# Patient Record
Sex: FEMALE | Race: BLACK OR AFRICAN AMERICAN
[De-identification: names, ages, dates, MRNs, and addresses within clinical notes are randomized per-mention and may not be internally consistent; named-entity substitution may affect disease eponyms.]

---

## 2019-07-07 ENCOUNTER — HOSPITAL ENCOUNTER (EMERGENCY)
Dept: HOSPITAL 74 - FER | Age: 29
Discharge: HOME | End: 2019-07-07
Payer: COMMERCIAL

## 2019-07-07 VITALS — HEART RATE: 79 BPM | TEMPERATURE: 99.4 F

## 2019-07-07 VITALS — BODY MASS INDEX: 25 KG/M2

## 2019-07-07 VITALS — DIASTOLIC BLOOD PRESSURE: 102 MMHG | SYSTOLIC BLOOD PRESSURE: 141 MMHG

## 2019-07-07 DIAGNOSIS — J06.9: Primary | ICD-10-CM

## 2019-07-07 NOTE — PDOC
History of Present Illness





- General


History Source: Patient


Exam Limitations: No Limitations





- History of Present Illness


Initial Comments: 





07/07/19 13:15


Manjinder Diallo is a 29y previously healthy female presenting with nasal 

congestion. Nasal congestion started 3 days ago expressing green mucus. Cannot 

breathe through nasal passages. Associated right ear itching, throat irritation

, and 1d of generalized headache. Denies fever, vision change, chest pain, SOB, 

urinary, or bowel movement changes. Didn't use any medication for symptom 

relief. 





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





<Shawn Paris - Last Filed: 07/07/19 13:48>





<Portillo Farley - Last Filed: 07/07/19 14:42>





- General


Chief Complaint: Cold Symptoms


Stated Complaint: POSSIBLE SINUS INFECTION


Time Seen by Provider: 07/07/19 13:06





Past History





- Travel


Traveled outside of the country in the last 30 days: No


Close contact w/someone who was outside of country & ill: No





- Past Medical History


Asthma: No


Cardiac Disorders: No


COPD: No


CHF: No


Diabetes: No


GI Disorders: No


 Disorders: No


HTN: No


Liver Disease: No


Thyroid Disease: No


Other medical history: DENIES





- Suicide/Smoking/Psychosocial Hx


Smoking History: Current every day smoker


Number of Cigarettes Smoked Daily: 4


Information on smoking cessation initiated: Yes


Hx Alcohol Use: Yes (OCCASSIONAL)


Drug/Substance Use Hx: No





<RainaShawn - Last Filed: 07/07/19 13:48>





<Portillo Farley - Last Filed: 07/07/19 14:42>





- Past Medical History


Allergies/Adverse Reactions: 


 Allergies











Allergy/AdvReac Type Severity Reaction Status Date / Time


 


No Known Allergies Allergy   Unverified 07/07/19 12:52











Home Medications: 


Ambulatory Orders





Naproxen 375 mg PO TID #20 tablet 07/07/19 


Pseudoephedrine HCl [Sudafed] 30 mg PO QID #30 tablet 07/07/19 











**Review of Systems





- Review of Systems


Able to Perform ROS?: Yes


Is the patient limited English proficient: No


Constitutional: Yes: Chills, Fever, Weakness.  No: Diaphoresis


HEENTM: Yes: Nose Congestion, Throat Pain (irritation).  No: Eye Pain, Recent 

change in vision, Ear Pain, Ear Discharge, Tinnitus, Nose Bleeding, Hearing Loss

, Throat Swelling, Mouth Pain, Difficulty Swallowing, Mouth Swelling


Respiratory: No: Cough, Orthopnea, Shortness of Breath, Wheezing, Hemoptysis


Cardiac (ROS): No: Chest Pain, Edema, Lightheadedness, Palpitations, Syncope, 

Chest Tightness


ABD/GI: No: Constipated, Diarrhea, Nausea, Vomiting


: No: Burning, Dysuria, Discharge, Frequency


Musculoskeletal: No: Back Pain, Joint Pain, Muscle Pain


Integumentary: No: Bruising, Change in Color, Rash


Neurological: Yes: Headache.  No: Numbness, Paresthesia, Seizure, Tremors


Endocrine: No: Excessive Sweating, Flushing, Intolerance to Cold, Intolerance 

to Heat





<Shawn Paris - Last Filed: 07/07/19 13:48>





*Physical Exam





- Vital Signs


 Last Vital Signs











Temp Pulse Resp BP Pulse Ox


 


 99.4 F   79   16   147/109 H  100 


 


 07/07/19 12:49  07/07/19 12:49  07/07/19 12:49  07/07/19 12:49  07/07/19 12:49














- Physical Exam


General Appearance: Yes: Nourished, Mild Distress


HEENT: positive: CHANA, Normal Voice, TMs Normal, Pharyngeal Erythema, Tonsillar 

Erythema, Nasal Congestion, Sinus Tenderness, Hearing Grossly Normal, Other (

normal external ear, auditory canal erythma).  negative: Tonsillar Exudate, 

Hearing Decreased, TM Bulging, TM Dull, TM Erythema, Lesions


Respiratory/Chest: positive: Lungs Clear, Normal Breath Sounds.  negative: 

Chest Tender, Respiratory Distress, Crackles, Rales, Rhonchi, Stridor, Wheezing


Cardiovascular: positive: Regular Rhythm, Regular Rate, S1, S2.  negative: 

Murmur


Integumentary: positive: Normal Color


Neurologic: positive: Fully Oriented, Alert, Normal Mood/Affect, Normal Response

, Responsive.  negative: Numbness, Confused, Disoriented, Depressed Affect





<Shawn Paris - Last Filed: 07/07/19 13:48>





- Vital Signs


 Last Vital Signs











Temp Pulse Resp BP Pulse Ox


 


 99.4 F   79   16   141/102 H  100 


 


 07/07/19 12:49  07/07/19 12:49  07/07/19 12:49  07/07/19 13:39  07/07/19 12:49














<Portillo Farley - Last Filed: 07/07/19 14:42>





ED Treatment Course





- Medications


Given in the ED: 


ED Medications














Discontinued Medications














Generic Name Dose Route Start Last Admin





  Trade Name Freq  PRN Reason Stop Dose Admin


 


Naproxen  375 mg  07/07/19 13:21  07/07/19 13:26





  Naprosyn -  PO  07/07/19 13:22  375 mg





  ONCE ONE   Administration





     





     





     





     


 


Pseudoephedrine HCl  30 mg  07/07/19 13:30  07/07/19 13:27





  Sudafed -  PO   30 mg





  Q6H JESSICA   Administration





     





     





     





     














<Portillo Farley - Last Filed: 07/07/19 14:42>





Medical Decision Making





- Medical Decision Making





07/07/19 13:25


Manjinder Diallo is a 29y previously healthy female presenting with 3d nasal 

congestion. 


Likely viral rhinitis or sinusitis based on nasal symptoms and recent onset. 

Considered bacterial URI if symptoms lasted more than a week. Unlikely 

pneumonia d/t absence of abnormal lung physical exam findings. 





Will treat with sudafed for nasal congestion and naproxen for headache. 

Discharged with sudafed and naproxen and told to follow up with PCP for 

elevated /100 (repeated x2) in ED.





<RainaShawn palacios - Last Filed: 07/07/19 13:48>





*DC/Admit/Observation/Transfer





<Shawn Paris - Last Filed: 07/07/19 13:48>





<Portillo Farley - Last Filed: 07/07/19 14:42>


Diagnosis at time of Disposition: 


 Viral upper respiratory infection








- Discharge Dispostion


Disposition: HOME


Condition at time of disposition: Stable





- Prescriptions


Prescriptions: 


Naproxen 375 mg PO TID #20 tablet


Pseudoephedrine HCl [Sudafed] 30 mg PO QID #30 tablet





- Patient Instructions


Printed Discharge Instructions:  DI for Viral Upper Respiratory Infection -- 

Adult


Additional Instructions: 


You were seen in the ED for nasal congestion. You were given medication to 

relieve your congestion and headache. Take prescribed medication as directed.





Please follow up with your primary care doctor if your symptoms last more than 

a week. 


**Please follow up for repeat blood pressure screening in the next 10 days.





Come back to the ED if you have worsening headache, fever, or vomiting.





- Post Discharge Activity


Forms/Work/School Notes:  Back to Work

## 2019-07-07 NOTE — PDOC
Attending Attestation





- Resident


Resident Name: Raina,Shawn





- ED Attending Attestation


I have performed the following: I have examined & evaluated the patient, The 

case was reviewed & discussed with the resident, I agree w/resident's findings 

& plan





- HPI


HPI: 





07/07/19 13:20


Patient is a healthy 30 y/o woman who presents with 4 days of nasal congestion 

and scratchy throat. 





No fever or chills


No cough, chest pain or sob


No vision changes


No hx of complicated sinus infections





- Physicial Exam


PE: 





07/07/19 13:26


Awake and alert, appears well, voice has nasal quality


PERRL with nl orbits and EOMs


nose with erythema and swelling of membranes, clear discharge


TMs with slight injection, canals nl, ext ear nl


OP with nl size tonsils, no erythema or exudates


neck supple, no adenopathy


chest clear


heart rr, nl s1s2


abd benign


extem and skin nl





- Medical Decision Making





07/07/19 13:29


MDM: uncomplicated rhinosinusitis


no signs of any complications, short duration, no fever





Rx:


naprosyn


sudafed








BP elevated, patient advised to follow up within 10 days for repeat blood 

pressure check to assess for hypertension which may require treatment.

## 2019-10-21 ENCOUNTER — HOSPITAL ENCOUNTER (EMERGENCY)
Dept: HOSPITAL 74 - FER | Age: 29
LOS: 1 days | Discharge: HOME | End: 2019-10-22
Payer: SELF-PAY

## 2019-10-21 VITALS — TEMPERATURE: 98.4 F | SYSTOLIC BLOOD PRESSURE: 155 MMHG | DIASTOLIC BLOOD PRESSURE: 112 MMHG | HEART RATE: 73 BPM

## 2019-10-21 VITALS — BODY MASS INDEX: 25.8 KG/M2

## 2019-10-21 DIAGNOSIS — Z86.14: ICD-10-CM

## 2019-10-21 DIAGNOSIS — L02.212: Primary | ICD-10-CM

## 2019-10-21 NOTE — PDOC
History of Present Illness





- General


Chief Complaint: Pain


Stated Complaint: GROWTH ON BACK X 5 DAYS


Time Seen by Provider: 10/21/19 23:46





- History of Present Illness


Initial Comments: 





This 29-year-old woman with no significant past medical history but one episode 

of skin abscess at the base of her spine/pilonidal area a few years ago, 

presents with several day history of painful swelling in her left mid back.  

Patient states that this area originally felt like "pimple" but grew in size 

over a few days.  Patient states that she squeezed the area and a large amount 

of pus mixed with blood drained over the last 48 hours.  She presents now 

because, although no further drainage is present, the area is still somewhat 

painful.  She denies fever/chills or any other history of abscess/cellulitis.





According the patient, the previous abscess that was drained grew MRSA.


Patient works in childcare.


No known allergies


No daily medications





Denies smoking; no daily alcohol or recreational drug use








Past History





- Past Medical History


Allergies/Adverse Reactions: 


 Allergies











Allergy/AdvReac Type Severity Reaction Status Date / Time


 


No Known Allergies Allergy   Verified 10/21/19 23:50











Home Medications: 


Ambulatory Orders





Clindamycin HCl [Cleocin HCl] 300 mg PO TID #12 capsule 10/22/19 








Asthma: No


Cardiac Disorders: No


COPD: No


CHF: No


Diabetes: No


GI Disorders: No


 Disorders: No


HTN: No


Liver Disease: No


Thyroid Disease: No





- Psycho Social/Smoking Cessation Hx


Smoking History: Current every day smoker


Number of Cigarettes Smoked Daily: 4


Hx Alcohol Use: Yes (OCCASSIONAL)


Drug/Substance Use Hx: No





**Review of Systems





- Review of Systems


Able to Perform ROS?: Yes


Comments:: 





12 point review of systems is negative except for what is noted in the history 

of present illness











*Physical Exam





- Physical Exam


Comments: 





GENERAL: Adult female, alert and oriented x3, in no acute distress


HEAD: Normal with no signs of trauma.


EYES: PERRLA, EOMI, sclera anicteric, conjunctiva clear.


ENT: Ears normal, nares patent, oropharynx clear without exudates.  Moist 

mucous membranes.


NECK: Normal range of motion, supple without lymphadenopathy, JVD, or masses.


LUNGS: Breath sounds equal, clear to auscultation bilaterally.  No wheezes, and 

no crackles.


NEUROLOGICAL: Cranial nerves II through XII grossly intact.  Normal speech.  No 

focal neurological deficits. 


MUSCULOSKELETAL:  Back non-tender to palpation, no CVA tenderness


SKIN: Left periscapular area: 0.5 cm by 0.5 cm mildly tender, slightly 

edematous lesion with 1 cm indurated border; no erythema/fluctuance/discharge 

or increased warmth present.


           No other skin lesions present


      








Medical Decision Making





- Medical Decision Making





As noted above, this 29-year-old woman with a history of skin abscess which 

grew out MRSA, drained a few years ago, presents with history of apparent skin 

abscess of the left periscapular area that drained spontaneously over the last 

few days (patient admits expressing the discharge manually).  She presents to 

the ER because of persistent pain in the area.  On exam, although the area is 

mildly tender and slightly edematous, there is no evidence of fluctuance or 

acute inflammation.





Clinical presentation consistent with healing localized skin infection; 

although abscess may have been present earlier, there is no evidence of 

persistent subcutaneous fluid collection.  Because she has a history of MRSA 

infection, short course of antibiotic effective for MRSA will be prescribed.  

She has no evidence of systemic infection with no fever and she has no history 

of immunocompromise, therefore antibiotic will be given orally.  First dose of 

clindamycin 300 mg given here in the ER and 4-day course of clindamycin 300 mg 

3 times a day sent to her pharmacy.


Patient should continue warm compresses to the area.


She should follow-up with her general medical doctor within the next week and 

return to the ER if she has worsening pain, recurrence of discharge or 

development of fever/chills.








Discharge





- Discharge Information


Problems reviewed: Yes


Clinical Impression/Diagnosis: 


 Abscess or cellulitis of back





Condition: Stable


Disposition: HOME





- Additional Discharge Information


Prescriptions: 


Clindamycin HCl [Cleocin HCl] 300 mg PO TID #12 capsule





- Follow up/Referral





- Patient Discharge Instructions


Patient Printed Discharge Instructions:  DI for Skin Abscess


Additional Instructions: 


Clindamycin 300 mg 3 times a day for 4  days


Warm packs to area of abscess at least 3-4 times a day


Motrin/Aleve/Tylenol as needed for pain


Follow-up with your general doctor within the next week


Return to ER if  drainage returns or you have  severe pain /fever








- Post Discharge Activity


Work/Back to School Note:  Back to Work